# Patient Record
Sex: FEMALE | Race: BLACK OR AFRICAN AMERICAN | NOT HISPANIC OR LATINO | URBAN - METROPOLITAN AREA
[De-identification: names, ages, dates, MRNs, and addresses within clinical notes are randomized per-mention and may not be internally consistent; named-entity substitution may affect disease eponyms.]

---

## 2020-08-07 ENCOUNTER — LAB OUTSIDE AN ENCOUNTER (OUTPATIENT)
Dept: URBAN - METROPOLITAN AREA CLINIC 25 | Facility: CLINIC | Age: 40
End: 2020-08-07

## 2020-08-07 ENCOUNTER — OFFICE VISIT (OUTPATIENT)
Dept: URBAN - METROPOLITAN AREA CLINIC 25 | Facility: CLINIC | Age: 40
End: 2020-08-07
Payer: COMMERCIAL

## 2020-08-07 DIAGNOSIS — E66.01 MORBID (SEVERE) OBESITY DUE TO EXCESS CALORIES: ICD-10-CM

## 2020-08-07 DIAGNOSIS — Z98.84 STATUS POST LAPAROSCOPIC SLEEVE GASTRECTOMY: ICD-10-CM

## 2020-08-07 DIAGNOSIS — K21.9 GASTROESOPHAGEAL REFLUX DISEASE, ESOPHAGITIS PRESENCE NOT SPECIFIED: ICD-10-CM

## 2020-08-07 PROCEDURE — G9903 PT SCRN TBCO ID AS NON USER: HCPCS | Performed by: INTERNAL MEDICINE

## 2020-08-07 PROCEDURE — G8427 DOCREV CUR MEDS BY ELIG CLIN: HCPCS | Performed by: INTERNAL MEDICINE

## 2020-08-07 PROCEDURE — 99213 OFFICE O/P EST LOW 20 MIN: CPT | Performed by: INTERNAL MEDICINE

## 2020-08-07 PROCEDURE — G8417 CALC BMI ABV UP PARAM F/U: HCPCS | Performed by: INTERNAL MEDICINE

## 2020-08-07 RX ORDER — FAMOTIDINE 40 MG/1
1 TABLET AT BEDTIME TABLET, FILM COATED ORAL ONCE A DAY
Qty: 90 | Refills: 1 | OUTPATIENT
Start: 2020-08-07

## 2020-08-07 NOTE — HPI-OTHER HISTORIES
June 2019 - EGD in jan 2017 revealed gastric sleeve anatomy. gastritis. small hiatal hernia. No H Pylori.  Severe heartburn. taking zantac 150 mg which helps some. Went to pcp 2 weeks ago and had labs done. no dysphagia. Hx ventral hernia surgery.  2016  -Pt reports undergoing gastric sleeve surgery as well as lap cholecystectomy in 2013. She has been experiecing abdominal pain - supraumbilical, feels like a lump , tender to palpation, sensation of moving  / churning associated with lump.

## 2020-08-07 NOTE — HPI-TODAY'S VISIT:
AUG 2020 : stopped using ranitidine. no dysphagia. chest burning. seeing dr calixto who is planning surgery for abdominal hernia.  EGD in July 2019 revealed post gastric sleeve anatomy with no other abnormal findings.  Biopsy from the stomach revealed chronic gastritis with no H. pylori, esophageal biopsy did not reveal any esophagitis.

## 2020-08-27 ENCOUNTER — OFFICE VISIT (OUTPATIENT)
Dept: URBAN - METROPOLITAN AREA SURGERY CENTER 20 | Facility: SURGERY CENTER | Age: 40
End: 2020-08-27
Payer: COMMERCIAL

## 2020-08-27 DIAGNOSIS — K29.30 CHRONIC SUPERFICIAL GASTRITIS: ICD-10-CM

## 2020-08-27 PROCEDURE — G8907 PT DOC NO EVENTS ON DISCHARG: HCPCS | Performed by: INTERNAL MEDICINE

## 2020-08-27 PROCEDURE — 43239 EGD BIOPSY SINGLE/MULTIPLE: CPT | Performed by: INTERNAL MEDICINE

## 2020-08-27 RX ORDER — ESOMEPRAZOLE MAGNESIUM 20 MG/1
1 CAPSULE, 1 HR PRIOR TO BREAKFAST CAPSULE, DELAYED RELEASE ORAL ONCE A DAY
Qty: 90 | Refills: 3 | OUTPATIENT
Start: 2020-08-27

## 2020-08-27 RX ORDER — FAMOTIDINE 40 MG/1
1 TABLET AT BEDTIME TABLET, FILM COATED ORAL ONCE A DAY
Qty: 90 | Refills: 1 | Status: ACTIVE | COMMUNITY
Start: 2020-08-07

## 2020-09-01 ENCOUNTER — TELEPHONE ENCOUNTER (OUTPATIENT)
Dept: URBAN - METROPOLITAN AREA CLINIC 92 | Facility: CLINIC | Age: 40
End: 2020-09-01

## 2020-09-11 ENCOUNTER — TELEPHONE ENCOUNTER (OUTPATIENT)
Dept: URBAN - METROPOLITAN AREA CLINIC 25 | Facility: CLINIC | Age: 40
End: 2020-09-11

## 2020-10-06 ENCOUNTER — TELEPHONE ENCOUNTER (OUTPATIENT)
Dept: URBAN - METROPOLITAN AREA CLINIC 25 | Facility: CLINIC | Age: 40
End: 2020-10-06

## 2020-10-07 ENCOUNTER — TELEPHONE ENCOUNTER (OUTPATIENT)
Dept: URBAN - METROPOLITAN AREA CLINIC 25 | Facility: CLINIC | Age: 40
End: 2020-10-07

## 2020-10-07 RX ORDER — PANTOPRAZOLE SODIUM 20 MG/1
1 TABLET TABLET, DELAYED RELEASE ORAL ONCE A DAY
Qty: 90 TABLET | OUTPATIENT
Start: 2020-10-07

## 2020-10-09 ENCOUNTER — OFFICE VISIT (OUTPATIENT)
Dept: URBAN - METROPOLITAN AREA CLINIC 25 | Facility: CLINIC | Age: 40
End: 2020-10-09

## 2020-10-16 ENCOUNTER — LAB OUTSIDE AN ENCOUNTER (OUTPATIENT)
Dept: URBAN - METROPOLITAN AREA CLINIC 37 | Facility: CLINIC | Age: 40
End: 2020-10-16

## 2020-10-16 ENCOUNTER — OFFICE VISIT (OUTPATIENT)
Dept: URBAN - METROPOLITAN AREA CLINIC 37 | Facility: CLINIC | Age: 40
End: 2020-10-16

## 2020-10-16 VITALS — HEIGHT: 65 IN | WEIGHT: 236 LBS | BODY MASS INDEX: 39.32 KG/M2

## 2020-10-16 RX ORDER — HYOSCYAMINE SULFATE 0.125 MG
1 TABLET ON THE TONGUE AND ALLOW TO DISSOLVE  AS NEEDED TABLET,DISINTEGRATING ORAL
Status: ACTIVE | COMMUNITY

## 2020-10-16 RX ORDER — METOCLOPRAMIDE HYDROCHLORIDE 10 MG/1
1 TABLET TABLET ORAL
Qty: 90 | Status: ACTIVE | COMMUNITY

## 2020-10-16 RX ORDER — NALTREXONE HYDROCHLORIDE AND BUPROPION HYDROCHLORIDE 8; 90 MG/1; MG/1
2 TABLETS TABLET, EXTENDED RELEASE ORAL TWICE A DAY
Qty: 120 | Status: ACTIVE | COMMUNITY

## 2020-10-16 RX ORDER — SUCRALFATE 1 G/10ML
10 ML ON AN EMPTY STOMACH BEFORE MEALS SUSPENSION ORAL TID
Status: ACTIVE | COMMUNITY

## 2020-10-16 RX ORDER — AMITRIPTYLINE HYDROCHLORIDE 10 MG/1
1 TABLET AT BEDTIME TABLET, FILM COATED ORAL ONCE A DAY
Qty: 30 | Status: ACTIVE | COMMUNITY

## 2020-10-16 RX ORDER — SCOPOLAMINE 1 MG/3D
1 PATCH TO SKIN BEHIND THE EAR AS NEEDED PATCH TRANSDERMAL
Qty: 9 | Status: ON HOLD | COMMUNITY

## 2020-10-16 RX ORDER — KETOROLAC TROMETHAMINE 30 MG/ML
0.5 ML AS NEEDED INJECTION, SOLUTION INTRAMUSCULAR; INTRAVENOUS
Qty: 10 | Status: ACTIVE | COMMUNITY

## 2020-10-16 NOTE — HPI-MIGRATED HPI
Initial consultation : Patient is here for -> Nausea, spitting up food Patient has history of gastric sleeve in 2014, recently has experienced severe heartburn and spit up food . She went to see Dr. Rock Hightower at [facility]   and had the sleeve revised due to " being twisted" reportedly. After the surgery, she even felt worse. Subsequently, she had a EGD with balloon dilatation up to 18 mm  on 10/12/2020 due to dysphagia  Her symptoms progressively worsen and she was referred to GI specialist for further evaluation. According to the patient she was told that she needs EGD with  bigger balloon dilatation  Russell had a Laparoscopic paraesophageal hernia repair on 09/08/2020 Patient was prescribed Reglan 10 mg QID   * CTscan abdomen/pelvis w/ contrast on 09/14/2020 1. Moderate infiltration of the subcutaneous fat in the periumbilical region with associated gas, presumably reactive from the known history of recent surgery. No focal fluid collections are seen here. Recommend continued close clinical monitoring and imaging follow-up to resolution to exclude developing infection here 2. No gross bowel obstruction or bowel wall thickening. No obstructive nephropathy 3. Mild dependent bibasilar ateleotasis, left greater than right 4. Trace free fluid in the pelvis, possibly postsurgical or physiologic  Current symptoms: dysphagia, spitting up food . Pain in abdomen;

## 2020-10-17 PROBLEM — 161533003: Status: ACTIVE | Noted: 2020-10-17

## 2020-10-17 PROBLEM — 161615003: Status: ACTIVE | Noted: 2020-10-17

## 2020-10-17 PROBLEM — 162864005: Status: ACTIVE | Noted: 2020-10-17

## 2020-10-17 PROBLEM — 40739000: Status: ACTIVE | Noted: 2020-10-16

## 2020-10-17 PROBLEM — 63305008: Status: ACTIVE | Noted: 2020-10-17

## 2020-10-17 PROBLEM — 235595009: Status: ACTIVE | Noted: 2020-10-17

## 2020-10-23 ENCOUNTER — OFFICE VISIT (OUTPATIENT)
Dept: URBAN - METROPOLITAN AREA MEDICAL CENTER 10 | Facility: MEDICAL CENTER | Age: 40
End: 2020-10-23

## 2020-10-23 ENCOUNTER — TELEPHONE ENCOUNTER (OUTPATIENT)
Dept: URBAN - METROPOLITAN AREA CLINIC 92 | Facility: CLINIC | Age: 40
End: 2020-10-23

## 2020-10-23 ENCOUNTER — OFFICE VISIT (OUTPATIENT)
Dept: URBAN - METROPOLITAN AREA TELEHEALTH 2 | Facility: TELEHEALTH | Age: 40
End: 2020-10-23

## 2020-10-23 RX ORDER — HYOSCYAMINE SULFATE 0.125 MG
1 TABLET ON THE TONGUE AND ALLOW TO DISSOLVE  AS NEEDED TABLET,DISINTEGRATING ORAL
Status: ACTIVE | COMMUNITY

## 2020-10-23 RX ORDER — KETOROLAC TROMETHAMINE 30 MG/ML
0.5 ML AS NEEDED INJECTION, SOLUTION INTRAMUSCULAR; INTRAVENOUS
Qty: 10 | Status: ACTIVE | COMMUNITY

## 2020-10-23 RX ORDER — FAMOTIDINE 40 MG/1
1 TABLET AT BEDTIME TABLET, FILM COATED ORAL ONCE A DAY
Qty: 90 | Refills: 1 | Status: ACTIVE | COMMUNITY
Start: 2020-08-07

## 2020-10-23 RX ORDER — SUCRALFATE 1 G/10ML
10 ML ON AN EMPTY STOMACH BEFORE MEALS SUSPENSION ORAL TID
Status: ACTIVE | COMMUNITY

## 2020-10-23 RX ORDER — ESOMEPRAZOLE MAGNESIUM 20 MG/1
1 CAPSULE, 1 HR PRIOR TO BREAKFAST CAPSULE, DELAYED RELEASE ORAL ONCE A DAY
Qty: 90 | Refills: 3 | Status: ACTIVE | COMMUNITY
Start: 2020-08-27

## 2020-10-23 RX ORDER — PANTOPRAZOLE SODIUM 20 MG/1
1 TABLET TABLET, DELAYED RELEASE ORAL ONCE A DAY
Qty: 90 TABLET | Status: ACTIVE | COMMUNITY
Start: 2020-10-07

## 2020-10-23 RX ORDER — FAMOTIDINE 40 MG/1
1 TABLET AT BEDTIME TABLET, FILM COATED ORAL ONCE A DAY
Qty: 90 | Refills: 1 | OUTPATIENT

## 2020-10-23 RX ORDER — NALTREXONE HYDROCHLORIDE AND BUPROPION HYDROCHLORIDE 8; 90 MG/1; MG/1
2 TABLETS TABLET, EXTENDED RELEASE ORAL TWICE A DAY
Qty: 120 | Status: ACTIVE | COMMUNITY

## 2020-10-23 RX ORDER — AMITRIPTYLINE HYDROCHLORIDE 10 MG/1
1 TABLET AT BEDTIME TABLET, FILM COATED ORAL ONCE A DAY
Qty: 30 | Status: ACTIVE | COMMUNITY

## 2020-10-23 RX ORDER — METOCLOPRAMIDE HYDROCHLORIDE 10 MG/1
1 TABLET TABLET ORAL
Qty: 90 | Status: ACTIVE | COMMUNITY

## 2020-10-23 RX ORDER — SCOPOLAMINE 1 MG/3D
1 PATCH TO SKIN BEHIND THE EAR AS NEEDED PATCH TRANSDERMAL
Qty: 9 | Status: ON HOLD | COMMUNITY

## 2020-10-23 NOTE — HPI-OTHER HISTORIES
AUG 2020 : stopped using ranitidine. no dysphagia. chest burning. seeing dr calixto who is planning surgery for abdominal hernia. EGD in July 2019 revealed post gastric sleeve anatomy with no other abnormal findings.  Biopsy from the stomach revealed chronic gastritis with no H. pylori, esophageal biopsy did not reveal any esophagitis. June 2019 - EGD in jan 2017 revealed gastric sleeve anatomy. gastritis. small hiatal hernia. No H Pylori.  Severe heartburn. taking zantac 150 mg which helps some. Went to pcp 2 weeks ago and had labs done. no dysphagia. Hx ventral hernia surgery.  2016  -Pt reports undergoing gastric sleeve surgery as well as lap cholecystectomy in 2013. She has been experiecing abdominal pain - supraumbilical, feels like a lump , tender to palpation, sensation of moving  / churning associated with lump.

## 2020-10-23 NOTE — HPI-TODAY'S VISIT:
Oct 2020:  EGD in August 2020 revealed post gastric sleeve anatomy but unrevealing endoscopic features.  No H. pylori infection, no reflux esophagitis present in proximal or distal esophageal biopsy.

## 2020-11-10 ENCOUNTER — OFFICE VISIT (OUTPATIENT)
Dept: URBAN - METROPOLITAN AREA CLINIC 35 | Facility: CLINIC | Age: 40
End: 2020-11-10

## 2020-11-10 VITALS — BODY MASS INDEX: 40.82 KG/M2 | WEIGHT: 245 LBS | HEIGHT: 65 IN

## 2020-11-10 RX ORDER — AMITRIPTYLINE HYDROCHLORIDE 10 MG/1
1 TABLET AT BEDTIME TABLET, FILM COATED ORAL ONCE A DAY
Qty: 30 | Status: ACTIVE | COMMUNITY

## 2020-11-10 RX ORDER — PANTOPRAZOLE SODIUM 40 MG/1
1 TABLET TABLET, DELAYED RELEASE ORAL
Qty: 30 | Refills: 1 | OUTPATIENT
Start: 2020-11-10

## 2020-11-10 RX ORDER — PANTOPRAZOLE SODIUM 40 MG/1
1 TABLET TABLET, DELAYED RELEASE ORAL ONCE A DAY
Status: ON HOLD | COMMUNITY

## 2020-11-10 RX ORDER — HYOSCYAMINE SULFATE 0.125 MG
1 TABLET ON THE TONGUE AND ALLOW TO DISSOLVE  AS NEEDED TABLET,DISINTEGRATING ORAL
Status: ACTIVE | COMMUNITY

## 2020-11-10 RX ORDER — SCOPOLAMINE 1 MG/3D
1 PATCH TO SKIN BEHIND THE EAR AS NEEDED PATCH TRANSDERMAL
Qty: 9 | Status: ON HOLD | COMMUNITY

## 2020-11-10 RX ORDER — FAMOTIDINE 20 MG/1
1 TABLET AT BEDTIME AS NEEDED TABLET, FILM COATED ORAL ONCE A DAY
Qty: 30 | Status: ACTIVE | COMMUNITY

## 2020-11-10 RX ORDER — SUCRALFATE 1 G/10ML
10 ML ON AN EMPTY STOMACH SUSPENSION ORAL TWICE A DAY
Qty: 600 | Refills: 1 | OUTPATIENT
Start: 2020-11-10

## 2020-11-10 RX ORDER — SUCRALFATE 1 G/10ML
10 ML ON AN EMPTY STOMACH BEFORE MEALS SUSPENSION ORAL TID
Status: ACTIVE | COMMUNITY

## 2020-11-10 RX ORDER — KETOROLAC TROMETHAMINE 30 MG/ML
0.5 ML AS NEEDED INJECTION, SOLUTION INTRAMUSCULAR; INTRAVENOUS
Qty: 10 | Status: ACTIVE | COMMUNITY

## 2020-11-10 RX ORDER — METOCLOPRAMIDE HYDROCHLORIDE 10 MG/1
1 TABLET TABLET ORAL
Qty: 90 | Status: ACTIVE | COMMUNITY

## 2020-11-10 RX ORDER — NALTREXONE HYDROCHLORIDE AND BUPROPION HYDROCHLORIDE 8; 90 MG/1; MG/1
2 TABLETS TABLET, EXTENDED RELEASE ORAL TWICE A DAY
Qty: 120 | Status: ACTIVE | COMMUNITY

## 2020-11-10 NOTE — HPI-MIGRATED HPI
Post-op OV : Patient -> denies any new changes in his/her health status since last OV;   Post-op OV : After EGD on -> 10/23/2020 due to post-bariatric anastomotic stenosis, and nausea/vomiting. A small hiatal hernia was present. The GE junction was irregular. There was evidence of a sleeve gastrectomy in the gastric body. Moderate gastric stenosis was found in the incisura. S/p dilatation with a 30 mm pneumonic balloon. The balloon was inflated to 18 PSI (28 mm). Pressure was held for 120 seconds before ballon was deflated and withdrawn. There was a small amount of blood on the balloon. The dilatation site was examined following endoscopic reinsertion and showed moderate improvement in luminal narrowing. Normal duodenum.  After the procedure patient was prescribed Pantoprazole 40 mg daily + Sucralfate 1 gm BID but hasn't been taking any of the medications in these last several days;   Post-op OV : Patient reports of current symptoms -> acid reflux symptoms + pain around umbilicus since the sleeve surgery, ? hernia;   Post-op OV : Patient denies -> rectal bleeding, fever, nausea & vomiting since the procedure date;

## 2020-11-10 NOTE — EXAM-MIGRATED EXAMINATIONS
GENERAL APPEARANCE: - alert, alert, well hydrated, in no distress , well developed, well nourished;   HEAD: - normocephalic, atraumatic;   EYES: - sclera anicteric bilaterally;   NECK/THYROID: - neck supple, full range of motion, no cervical lymphadenopathy, no thyroid nodules, no thyromegaly, trachea midline;   NEUROLOGIC: - cranial nerves 2-12 grossly intact;   PSYCH: - cooperative with exam, mood/affect full range;

## 2020-11-24 PROBLEM — 39839004: Status: ACTIVE | Noted: 2020-11-05

## 2020-11-24 PROBLEM — 608848006: Status: ACTIVE | Noted: 2020-11-05

## 2020-11-24 PROBLEM — 16331000 HEARTBURN: Status: ACTIVE | Noted: 2020-11-10

## 2020-11-24 PROBLEM — 55860008: Status: ACTIVE | Noted: 2020-11-09

## 2020-11-24 PROBLEM — 16932000: Status: ACTIVE | Noted: 2020-11-05

## 2020-11-25 ENCOUNTER — OFFICE VISIT (OUTPATIENT)
Dept: URBAN - METROPOLITAN AREA CLINIC 35 | Facility: CLINIC | Age: 40
End: 2020-11-25

## 2020-11-25 RX ORDER — PANTOPRAZOLE SODIUM 40 MG/1
1 TABLET TABLET, DELAYED RELEASE ORAL
Qty: 30 | Refills: 1

## 2020-11-25 RX ORDER — PANTOPRAZOLE SODIUM 40 MG/1
1 TABLET TABLET, DELAYED RELEASE ORAL
Qty: 30 | Refills: 1 | COMMUNITY
Start: 2020-11-10

## 2020-11-25 RX ORDER — SUCRALFATE 1 G/10ML
10 ML ON AN EMPTY STOMACH SUSPENSION ORAL TWICE A DAY
Qty: 600 | Refills: 1 | COMMUNITY
Start: 2020-11-10

## 2020-11-25 RX ORDER — METOCLOPRAMIDE HYDROCHLORIDE 10 MG/1
1 TABLET TABLET ORAL
Qty: 90 | COMMUNITY

## 2020-11-25 RX ORDER — AMITRIPTYLINE HYDROCHLORIDE 10 MG/1
1 TABLET AT BEDTIME TABLET, FILM COATED ORAL ONCE A DAY
Qty: 30 | COMMUNITY

## 2020-11-25 RX ORDER — SCOPOLAMINE 1 MG/3D
1 PATCH TO SKIN BEHIND THE EAR AS NEEDED PATCH TRANSDERMAL
Qty: 9 | COMMUNITY

## 2020-11-25 RX ORDER — KETOROLAC TROMETHAMINE 30 MG/ML
0.5 ML AS NEEDED INJECTION, SOLUTION INTRAMUSCULAR; INTRAVENOUS
Qty: 10 | COMMUNITY

## 2020-11-25 RX ORDER — NALTREXONE HYDROCHLORIDE AND BUPROPION HYDROCHLORIDE 8; 90 MG/1; MG/1
2 TABLETS TABLET, EXTENDED RELEASE ORAL TWICE A DAY
Qty: 120 | COMMUNITY

## 2020-11-25 RX ORDER — FAMOTIDINE 20 MG/1
1 TABLET AT BEDTIME AS NEEDED TABLET, FILM COATED ORAL ONCE A DAY
Qty: 30 | COMMUNITY

## 2020-11-25 RX ORDER — HYOSCYAMINE SULFATE 0.125 MG
1 TABLET ON THE TONGUE AND ALLOW TO DISSOLVE  AS NEEDED TABLET,DISINTEGRATING ORAL
COMMUNITY

## 2020-11-25 RX ORDER — SUCRALFATE 1 G/10ML
10 ML ON AN EMPTY STOMACH SUSPENSION ORAL TWICE A DAY
Qty: 600 ML | Refills: 1

## 2020-11-25 NOTE — HPI-MIGRATED HPI
Follow up OV : Patient is here for a routine OV for -> Gastric stenosis, Nausea/Vomiting and Heartburn ;   Follow up OV : Last OV was -> 2 weeks ago Gastric stenosis/Nausea/Vomiting:  Patient has a surgical history of gastric sleeve in 2014 EGD with me on 10/2020 due to post-bariatric anastomotic stenosis and nausea/vomiting. A small hiatal hernia was present. Moderate stenosis was found at the incisure S/p dilatation with a 30 mm pneumonic balloon. The balloon was inflated to 18 PSI (28 mm). The dilatation site was examined following endoscopic reinsertion and showed moderate improvement in luminal narrowing Patient continues using Zofran + antiemetic patch PRN  Current symptoms: ??   Heartburn:  Patient continues taking Sucralfate 10 mL BID + Pantoprazole 40 mg daily as advised last OV Current symptoms: ?? ;   Follow up OV : Patient is on -> ;

## 2020-11-25 NOTE — EXAM-MIGRATED EXAMINATIONS
GENERAL APPEARANCE: - alert, in no acute distress, well developed, well nourished;   HEAD: - normocephalic, atraumatic;   EYES: - sclera anicteric bilaterally;   NECK/THYROID: - neck supple, full range of motion, no cervical lymphadenopathy, no lymphadenopathy, no thyroid nodules, no thyromegaly, trachea midline;   NEUROLOGIC: - cranial nerves 2-12 grossly intact;   PSYCH: - cooperative with exam, mood/affect full range;

## 2021-01-07 ENCOUNTER — TELEPHONE ENCOUNTER (OUTPATIENT)
Dept: URBAN - METROPOLITAN AREA CLINIC 35 | Facility: CLINIC | Age: 41
End: 2021-01-07

## 2021-01-08 ENCOUNTER — ERX REFILL RESPONSE (OUTPATIENT)
Dept: URBAN - METROPOLITAN AREA CLINIC 25 | Facility: CLINIC | Age: 41
End: 2021-01-08

## 2021-01-08 RX ORDER — PANTOPRAZOLE SODIUM 20 MG/1
1 TABLET TABLET, DELAYED RELEASE ORAL ONCE A DAY
Qty: 90 | Refills: 4

## 2021-01-12 ENCOUNTER — OFFICE VISIT (OUTPATIENT)
Dept: URBAN - METROPOLITAN AREA CLINIC 35 | Facility: CLINIC | Age: 41
End: 2021-01-12

## 2021-01-12 VITALS — HEIGHT: 65 IN | BODY MASS INDEX: 40.82 KG/M2 | WEIGHT: 245 LBS

## 2021-01-12 RX ORDER — FAMOTIDINE 20 MG/1
1 TABLET AT BEDTIME AS NEEDED TABLET, FILM COATED ORAL ONCE A DAY
Qty: 30 | Status: ON HOLD | COMMUNITY

## 2021-01-12 RX ORDER — SCOPOLAMINE 1 MG/3D
1 PATCH TO SKIN BEHIND THE EAR AS NEEDED PATCH TRANSDERMAL
Qty: 9 | Status: ON HOLD | COMMUNITY

## 2021-01-12 RX ORDER — METOCLOPRAMIDE HYDROCHLORIDE 10 MG/1
1 TABLET TABLET ORAL
Qty: 90 | Status: ON HOLD | COMMUNITY

## 2021-01-12 RX ORDER — NALTREXONE HYDROCHLORIDE AND BUPROPION HYDROCHLORIDE 8; 90 MG/1; MG/1
2 TABLETS TABLET, EXTENDED RELEASE ORAL TWICE A DAY
Qty: 120 | Status: ON HOLD | COMMUNITY

## 2021-01-12 RX ORDER — AMITRIPTYLINE HYDROCHLORIDE 10 MG/1
1 TABLET AT BEDTIME TABLET, FILM COATED ORAL ONCE A DAY
Qty: 30 | Status: ON HOLD | COMMUNITY

## 2021-01-12 RX ORDER — PANTOPRAZOLE SODIUM 40 MG/1
1 TABLET TABLET, DELAYED RELEASE ORAL
Qty: 30 | Refills: 1

## 2021-01-12 RX ORDER — KETOROLAC TROMETHAMINE 30 MG/ML
0.5 ML AS NEEDED INJECTION, SOLUTION INTRAMUSCULAR; INTRAVENOUS
Qty: 10 | Status: ON HOLD | COMMUNITY

## 2021-01-12 RX ORDER — PANTOPRAZOLE SODIUM 40 MG/1
1 TABLET TABLET, DELAYED RELEASE ORAL
Qty: 30 | Refills: 1 | Status: ACTIVE | COMMUNITY

## 2021-01-12 RX ORDER — SUCRALFATE 1 G/10ML
10 ML ON AN EMPTY STOMACH SUSPENSION ORAL TWICE A DAY
Qty: 280 ML | Refills: 1

## 2021-01-12 RX ORDER — SUCRALFATE 1 G/10ML
10 ML ON AN EMPTY STOMACH SUSPENSION ORAL TWICE A DAY
Qty: 600 ML | Refills: 1 | Status: ACTIVE | COMMUNITY

## 2021-01-12 RX ORDER — HYOSCYAMINE SULFATE 0.125 MG
1 TABLET ON THE TONGUE AND ALLOW TO DISSOLVE  AS NEEDED TABLET,DISINTEGRATING ORAL
Status: ON HOLD | COMMUNITY

## 2021-02-12 ENCOUNTER — TELEPHONE ENCOUNTER (OUTPATIENT)
Dept: URBAN - METROPOLITAN AREA CLINIC 35 | Facility: CLINIC | Age: 41
End: 2021-02-12

## 2021-07-30 NOTE — HPI-MIGRATED HPI
Follow up OV : Last OV was -> 2 months ago  Gastric stenosis/Nausea/Vomiting:  Patient has a surgical history of gastric sleeve in 2014 EGD with me on 10/2020 due to post-bariatric anastomotic stenosis and nausea/vomiting. A small hiatal hernia was present. Moderate stenosis was found at the incisure S/p dilatation with a 30 mm pneumonic balloon. The balloon was inflated to 18 PSI (28 mm). The dilatation site was examined following endoscopic reinsertion and showed moderate improvement in luminal narrowing Patient no longer using Zofran or antiemetic patch Current symptoms: occasional nausea only Able to eat better now  Heartburn:  Patient continues taking Sucralfate 10 mL BID + Pantoprazole 40 mg  Current symptoms: + heartburn symptoms if not on those meds;   Follow up OV : Patient is on -> ;   Follow up OV : Patient is here for a routine OV for -> Gastric stenosis, Nausea/Vomiting and Heartburn ;    160

## 2021-08-11 ENCOUNTER — TELEPHONE ENCOUNTER (OUTPATIENT)
Dept: URBAN - METROPOLITAN AREA CLINIC 25 | Facility: CLINIC | Age: 41
End: 2021-08-11

## 2021-08-20 ENCOUNTER — LAB OUTSIDE AN ENCOUNTER (OUTPATIENT)
Dept: URBAN - METROPOLITAN AREA CLINIC 25 | Facility: CLINIC | Age: 41
End: 2021-08-20

## 2021-08-20 ENCOUNTER — OFFICE VISIT (OUTPATIENT)
Dept: URBAN - METROPOLITAN AREA CLINIC 25 | Facility: CLINIC | Age: 41
End: 2021-08-20
Payer: COMMERCIAL

## 2021-08-20 ENCOUNTER — WEB ENCOUNTER (OUTPATIENT)
Dept: URBAN - METROPOLITAN AREA CLINIC 25 | Facility: CLINIC | Age: 41
End: 2021-08-20

## 2021-08-20 VITALS
DIASTOLIC BLOOD PRESSURE: 62 MMHG | BODY MASS INDEX: 40.98 KG/M2 | WEIGHT: 255 LBS | TEMPERATURE: 97.1 F | SYSTOLIC BLOOD PRESSURE: 132 MMHG | HEIGHT: 66 IN

## 2021-08-20 DIAGNOSIS — R10.9 CENTRAL ABDOMINAL PAIN: ICD-10-CM

## 2021-08-20 DIAGNOSIS — K21.9 GASTROESOPHAGEAL REFLUX DISEASE, ESOPHAGITIS PRESENCE NOT SPECIFIED: ICD-10-CM

## 2021-08-20 DIAGNOSIS — K42.9 PERIUMBILICAL HERNIA: ICD-10-CM

## 2021-08-20 DIAGNOSIS — Z98.84 STATUS POST LAPAROSCOPIC SLEEVE GASTRECTOMY: ICD-10-CM

## 2021-08-20 DIAGNOSIS — E66.01 MORBID (SEVERE) OBESITY DUE TO EXCESS CALORIES: ICD-10-CM

## 2021-08-20 PROCEDURE — 99214 OFFICE O/P EST MOD 30 MIN: CPT | Performed by: INTERNAL MEDICINE

## 2021-08-20 RX ORDER — PANTOPRAZOLE SODIUM 20 MG/1
1 TABLET TABLET, DELAYED RELEASE ORAL ONCE A DAY
Qty: 90 | Refills: 4 | Status: ON HOLD | COMMUNITY

## 2021-08-20 RX ORDER — FAMOTIDINE 40 MG/1
1 TABLET AT BEDTIME TABLET, FILM COATED ORAL ONCE A DAY
Qty: 90 | Refills: 1 | Status: ACTIVE | COMMUNITY

## 2021-08-20 RX ORDER — ESOMEPRAZOLE MAGNESIUM 20 MG/1
1 CAPSULE, 1 HR PRIOR TO BREAKFAST CAPSULE, DELAYED RELEASE ORAL ONCE A DAY
Qty: 90 | Refills: 3 | Status: ON HOLD | COMMUNITY
Start: 2020-08-27

## 2021-08-20 NOTE — PHYSICAL EXAM GASTROINTESTINAL
Abdomen , soft, supraumbilical firm tender nodule , nondistended , no guarding or rigidity , no masses palpable , normal bowel sounds , Liver and Spleen , no hepatomegaly present , liver nontender , spleen not palpable

## 2021-08-20 NOTE — HPI-TODAY'S VISIT:
AUG 2021 :     EGD in August 2020 revealed post gastric sleeve anatomy but unrevealing endoscopic features.  No H. pylori infection, no reflux esophagitis present in proximal or distal esophageal biopsy.  underwent gastric surgery in sept 2020.  then underwent another revision surgery. was referred to  dr mcfadden. thinks she had another egd with possible dilation. Continues to have heartburn.    Went to [facility] and underwent CT scan - we dont have results. Was told she had an umbilical hernia ?  Records retrieved from [facility] from July 18, 2021.  Labs revealed a hemoglobin of 10.5, normal kidney function, normal WBC, normal AST, ALT, bilirubin, alkaline phosphatase, lipase,  a pelvic ultrasound revealed bilateral small simple ovarian cysts.  The CT scan of the abdomen and pelvis with contrast revealed small but irregular shaped fat-containing periumbilical hernia with mild stranding as well as a 4 mm right kidney stone and a left 3.3 cm simple appearing ovarian cyst.

## 2022-06-27 ENCOUNTER — LAB OUTSIDE AN ENCOUNTER (OUTPATIENT)
Dept: URBAN - METROPOLITAN AREA CLINIC 25 | Facility: CLINIC | Age: 42
End: 2022-06-27

## 2022-06-27 ENCOUNTER — WEB ENCOUNTER (OUTPATIENT)
Dept: URBAN - METROPOLITAN AREA CLINIC 25 | Facility: CLINIC | Age: 42
End: 2022-06-27

## 2022-06-27 ENCOUNTER — OFFICE VISIT (OUTPATIENT)
Dept: URBAN - METROPOLITAN AREA CLINIC 25 | Facility: CLINIC | Age: 42
End: 2022-06-27
Payer: COMMERCIAL

## 2022-06-27 VITALS
HEIGHT: 66 IN | HEART RATE: 92 BPM | BODY MASS INDEX: 36 KG/M2 | TEMPERATURE: 97.2 F | WEIGHT: 224 LBS | DIASTOLIC BLOOD PRESSURE: 83 MMHG | SYSTOLIC BLOOD PRESSURE: 124 MMHG

## 2022-06-27 DIAGNOSIS — K21.9 GASTROESOPHAGEAL REFLUX DISEASE, ESOPHAGITIS PRESENCE NOT SPECIFIED: ICD-10-CM

## 2022-06-27 DIAGNOSIS — Z98.84 STATUS POST LAPAROSCOPIC SLEEVE GASTRECTOMY: ICD-10-CM

## 2022-06-27 DIAGNOSIS — E66.01 MORBID (SEVERE) OBESITY DUE TO EXCESS CALORIES: ICD-10-CM

## 2022-06-27 DIAGNOSIS — K42.9 PERIUMBILICAL HERNIA: ICD-10-CM

## 2022-06-27 DIAGNOSIS — K92.1 HEMATOCHEZIA: ICD-10-CM

## 2022-06-27 PROCEDURE — 99214 OFFICE O/P EST MOD 30 MIN: CPT | Performed by: INTERNAL MEDICINE

## 2022-06-27 RX ORDER — ESOMEPRAZOLE MAGNESIUM 20 MG/1
1 CAPSULE, 1 HR PRIOR TO BREAKFAST CAPSULE, DELAYED RELEASE ORAL ONCE A DAY
Qty: 90 | Refills: 3 | Status: ON HOLD | COMMUNITY
Start: 2020-08-27

## 2022-06-27 RX ORDER — FAMOTIDINE 40 MG/1
1 TABLET AT BEDTIME TABLET, FILM COATED ORAL ONCE A DAY
Qty: 90 | Refills: 1 | Status: ON HOLD | COMMUNITY

## 2022-06-27 RX ORDER — PANTOPRAZOLE SODIUM 20 MG/1
1 TABLET TABLET, DELAYED RELEASE ORAL ONCE A DAY
Qty: 90 | Refills: 4 | Status: ACTIVE | COMMUNITY

## 2022-06-27 NOTE — HPI-TODAY'S VISIT:
June 2022 visit: Upper GI was ordered but I do not see this was done. undergoing pelvic surgery. hx rectal bleeding X 1 month. intermittent symptoms. painless.1 garndparent had colon cancer. no prior colonoscopy. saw dr carlito lester and dr mcmillan. thinks she had a gastric bypass.

## 2022-06-27 NOTE — HPI-OTHER HISTORIES
AUG 2021 :     EGD in August 2020 revealed post gastric sleeve anatomy but unrevealing endoscopic features.  No H. pylori infection, no reflux esophagitis present in proximal or distal esophageal biopsy.  underwent gastric surgery in sept 2020.  then underwent another revision surgery. was referred to  dr mcfadden. thinks she had another egd with possible dilation. Continues to have heartburn.    Went to [facility] and underwent CT scan - we dont have results. Was told she had an umbilical hernia ?  Records retrieved from [facility] from July 18, 2021.  Labs revealed a hemoglobin of 10.5, normal kidney function, normal WBC, normal AST, ALT, bilirubin, alkaline phosphatase, lipase,  a pelvic ultrasound revealed bilateral small simple ovarian cysts.  The CT scan of the abdomen and pelvis with contrast revealed small but irregular shaped fat-containing periumbilical hernia with mild stranding as well as a 4 mm right kidney stone and a left 3.3 cm simple appearing ovarian cyst.  AUG 2020 : stopped using ranitidine. no dysphagia. chest burning. seeing dr calixto who is planning surgery for abdominal hernia. EGD in July 2019 revealed post gastric sleeve anatomy with no other abnormal findings.  Biopsy from the stomach revealed chronic gastritis with no H. pylori, esophageal biopsy did not reveal any esophagitis. June 2019 - EGD in jan 2017 revealed gastric sleeve anatomy. gastritis. small hiatal hernia. No H Pylori.  Severe heartburn. taking zantac 150 mg which helps some. Went to pcp 2 weeks ago and had labs done. no dysphagia. Hx ventral hernia surgery.  2016  -Pt reports undergoing gastric sleeve surgery as well as lap cholecystectomy in 2013. She has been experiecing abdominal pain - supraumbilical, feels like a lump , tender to palpation, sensation of moving  / churning associated with lump.

## 2022-07-21 ENCOUNTER — CLAIMS CREATED FROM THE CLAIM WINDOW (OUTPATIENT)
Dept: URBAN - METROPOLITAN AREA CLINIC 4 | Facility: CLINIC | Age: 42
End: 2022-07-21
Payer: COMMERCIAL

## 2022-07-21 ENCOUNTER — OFFICE VISIT (OUTPATIENT)
Dept: URBAN - METROPOLITAN AREA SURGERY CENTER 20 | Facility: SURGERY CENTER | Age: 42
End: 2022-07-21
Payer: COMMERCIAL

## 2022-07-21 DIAGNOSIS — K92.1 ACUTE MELENA: ICD-10-CM

## 2022-07-21 DIAGNOSIS — K63.89 OTHER SPECIFIED DISEASES OF INTESTINE: ICD-10-CM

## 2022-07-21 DIAGNOSIS — K31.89 OTHER DISEASES OF STOMACH AND DUODENUM: ICD-10-CM

## 2022-07-21 DIAGNOSIS — K63.5 BENIGN COLON POLYP: ICD-10-CM

## 2022-07-21 DIAGNOSIS — K21.9 GASTRO-ESOPHAGEAL REFLUX DISEASE WITHOUT ESOPHAGITIS: ICD-10-CM

## 2022-07-21 DIAGNOSIS — K21.9 ACID REFLUX: ICD-10-CM

## 2022-07-21 PROCEDURE — 43239 EGD BIOPSY SINGLE/MULTIPLE: CPT | Performed by: INTERNAL MEDICINE

## 2022-07-21 PROCEDURE — 88312 SPECIAL STAINS GROUP 1: CPT | Performed by: PATHOLOGY

## 2022-07-21 PROCEDURE — 88305 TISSUE EXAM BY PATHOLOGIST: CPT | Performed by: PATHOLOGY

## 2022-07-21 PROCEDURE — 45380 COLONOSCOPY AND BIOPSY: CPT | Performed by: INTERNAL MEDICINE

## 2022-07-21 PROCEDURE — G8907 PT DOC NO EVENTS ON DISCHARG: HCPCS | Performed by: INTERNAL MEDICINE

## 2022-07-28 ENCOUNTER — TELEPHONE ENCOUNTER (OUTPATIENT)
Dept: URBAN - METROPOLITAN AREA CLINIC 92 | Facility: CLINIC | Age: 42
End: 2022-07-28

## 2022-08-02 ENCOUNTER — TELEPHONE ENCOUNTER (OUTPATIENT)
Dept: URBAN - METROPOLITAN AREA CLINIC 25 | Facility: CLINIC | Age: 42
End: 2022-08-02

## 2022-08-02 RX ORDER — DEXLANSOPRAZOLE 60 MG/1
1 CAPSULE CAPSULE, DELAYED RELEASE ORAL ONCE A DAY
Qty: 90 | Refills: 0 | OUTPATIENT
Start: 2022-08-02

## 2022-08-03 ENCOUNTER — TELEPHONE ENCOUNTER (OUTPATIENT)
Dept: URBAN - METROPOLITAN AREA CLINIC 92 | Facility: CLINIC | Age: 42
End: 2022-08-03

## 2022-08-26 ENCOUNTER — OFFICE VISIT (OUTPATIENT)
Dept: URBAN - METROPOLITAN AREA CLINIC 25 | Facility: CLINIC | Age: 42
End: 2022-08-26
Payer: COMMERCIAL

## 2022-08-26 VITALS
DIASTOLIC BLOOD PRESSURE: 93 MMHG | HEIGHT: 66 IN | HEART RATE: 85 BPM | TEMPERATURE: 97.9 F | WEIGHT: 218 LBS | SYSTOLIC BLOOD PRESSURE: 153 MMHG | BODY MASS INDEX: 35.03 KG/M2

## 2022-08-26 DIAGNOSIS — Z98.84 STATUS POST LAPAROSCOPIC SLEEVE GASTRECTOMY: ICD-10-CM

## 2022-08-26 DIAGNOSIS — K42.9 PERIUMBILICAL HERNIA: ICD-10-CM

## 2022-08-26 DIAGNOSIS — K92.1 HEMATOCHEZIA: ICD-10-CM

## 2022-08-26 DIAGNOSIS — E66.01 MORBID (SEVERE) OBESITY DUE TO EXCESS CALORIES: ICD-10-CM

## 2022-08-26 DIAGNOSIS — K21.9 GASTROESOPHAGEAL REFLUX DISEASE, ESOPHAGITIS PRESENCE NOT SPECIFIED: ICD-10-CM

## 2022-08-26 DIAGNOSIS — K59.04 CHRONIC IDIOPATHIC CONSTIPATION: ICD-10-CM

## 2022-08-26 PROCEDURE — 99213 OFFICE O/P EST LOW 20 MIN: CPT | Performed by: INTERNAL MEDICINE

## 2022-08-26 RX ORDER — PANTOPRAZOLE SODIUM 20 MG/1
1 TABLET TABLET, DELAYED RELEASE ORAL ONCE A DAY
Qty: 90 | Refills: 4 | Status: ACTIVE | COMMUNITY

## 2022-08-26 RX ORDER — HYDROCORTISONE 25 MG/G
1 APPLICATION CREAM TOPICAL TWICE A DAY
Qty: 1 | Refills: 0 | OUTPATIENT
Start: 2022-08-26 | End: 2022-09-09

## 2022-08-26 RX ORDER — DEXLANSOPRAZOLE 60 MG/1
1 CAPSULE CAPSULE, DELAYED RELEASE ORAL ONCE A DAY
Qty: 90 | Refills: 0 | Status: ON HOLD | COMMUNITY
Start: 2022-08-02

## 2022-08-26 NOTE — HPI-TODAY'S VISIT:
August 22 visit: Patient underwent a repeat EGD and a colonoscopy for further evaluation of her GI symptoms.  EGD was performed in July 22 which revealed post gastric bypass anatomy, normal esophagus, gastric pouch and jejunum with a normal anastomosis.  Gastric pouch biopsy did not reveal any H. pylori and distal esophagus biopsy revealed mild reflux type changes.  A colonoscopy in July 22 revealed 2 hyperplastic sigmoid polyps, normal terminal ileum, no endoscopic colitis, path confirmed benign hyperplastic polyps.  on protonix 20 mg and using carafate. still gets heartburn. despite using otc laxatives gets constipated.  recently had gyn surgery - unclear on exact details .dr fer shrestha.

## 2022-08-26 NOTE — HPI-OTHER HISTORIES
June 2022 visit: Upper GI was ordered but I do not see this was done. undergoing pelvic surgery. hx rectal bleeding X 1 month. intermittent symptoms. painless.1 garndparent had colon cancer. no prior colonoscopy. saw dr carlito lester and dr mcmillan. thinks she had a gastric bypass.   AUG 2021 :  EGD in August 2020 revealed post gastric sleeve anatomy but unrevealing endoscopic features.  No H. pylori infection, no reflux esophagitis present in proximal or distal esophageal biopsy.  underwent gastric surgery in sept 2020.  then underwent another revision surgery. was referred to  dr mcfadden. thinks she had another egd with possible dilation. Continues to have heartburn.    Went to [facility] and underwent CT scan - we dont have results. Was told she had an umbilical hernia ?  Records retrieved from [facility] from July 18, 2021.  Labs revealed a hemoglobin of 10.5, normal kidney function, normal WBC, normal AST, ALT, bilirubin, alkaline phosphatase, lipase,  a pelvic ultrasound revealed bilateral small simple ovarian cysts.  The CT scan of the abdomen and pelvis with contrast revealed small but irregular shaped fat-containing periumbilical hernia with mild stranding as well as a 4 mm right kidney stone and a left 3.3 cm simple appearing ovarian cyst.  AUG 2020 : stopped using ranitidine. no dysphagia. chest burning. seeing dr calixto who is planning surgery for abdominal hernia. EGD in July 2019 revealed post gastric sleeve anatomy with no other abnormal findings.  Biopsy from the stomach revealed chronic gastritis with no H. pylori, esophageal biopsy did not reveal any esophagitis. June 2019 - EGD in jan 2017 revealed gastric sleeve anatomy. gastritis. small hiatal hernia. No H Pylori.  Severe heartburn. taking zantac 150 mg which helps some. Went to pcp 2 weeks ago and had labs done. no dysphagia. Hx ventral hernia surgery.  2016  -Pt reports undergoing gastric sleeve surgery as well as lap cholecystectomy in 2013. She has been experiecing abdominal pain - supraumbilical, feels like a lump , tender to palpation, sensation of moving  / churning associated with lump.

## 2022-12-12 ENCOUNTER — OFFICE VISIT (OUTPATIENT)
Dept: URBAN - METROPOLITAN AREA CLINIC 25 | Facility: CLINIC | Age: 42
End: 2022-12-12

## 2022-12-12 PROBLEM — 238136002: Status: ACTIVE | Noted: 2020-08-07

## 2022-12-12 PROBLEM — 82934008 CHRONIC IDIOPATHIC CONSTIPATION: Status: ACTIVE | Noted: 2022-08-26

## 2022-12-12 RX ORDER — PANTOPRAZOLE SODIUM 20 MG/1
1 TABLET TABLET, DELAYED RELEASE ORAL ONCE A DAY
Qty: 90 | Refills: 4 | Status: ACTIVE | COMMUNITY

## 2022-12-12 RX ORDER — DEXLANSOPRAZOLE 60 MG/1
1 CAPSULE CAPSULE, DELAYED RELEASE ORAL ONCE A DAY
Qty: 90 | Refills: 0 | Status: ON HOLD | COMMUNITY
Start: 2022-08-02

## 2022-12-12 NOTE — HPI-OTHER HISTORIES
August 22 visit: Patient underwent a repeat EGD and a colonoscopy for further evaluation of her GI symptoms.  EGD was performed in July 22 which revealed post gastric bypass anatomy, normal esophagus, gastric pouch and jejunum with a normal anastomosis.  Gastric pouch biopsy did not reveal any H. pylori and distal esophagus biopsy revealed mild reflux type changes.  A colonoscopy in July 22 revealed 2 hyperplastic sigmoid polyps, normal terminal ileum, no endoscopic colitis, path confirmed benign hyperplastic polyps.  on protonix 20 mg and using carafate. still gets heartburn. despite using otc laxatives gets constipated.  recently had gyn surgery - unclear on exact details .dr fer shrestha.  June 2022 visit: Upper GI was ordered but I do not see this was done. undergoing pelvic surgery. hx rectal bleeding X 1 month. intermittent symptoms. painless.1 garndparent had colon cancer. no prior colonoscopy. saw dr carlito lester and dr mcmillan. thinks she had a gastric bypass.   AUG 2021 :  EGD in August 2020 revealed post gastric sleeve anatomy but unrevealing endoscopic features.  No H. pylori infection, no reflux esophagitis present in proximal or distal esophageal biopsy.  underwent gastric surgery in sept 2020.  then underwent another revision surgery. was referred to  dr mcfadden. thinks she had another egd with possible dilation. Continues to have heartburn.    Went to [facility] and underwent CT scan - we dont have results. Was told she had an umbilical hernia ?  Records retrieved from [facility] from July 18, 2021.  Labs revealed a hemoglobin of 10.5, normal kidney function, normal WBC, normal AST, ALT, bilirubin, alkaline phosphatase, lipase,  a pelvic ultrasound revealed bilateral small simple ovarian cysts.  The CT scan of the abdomen and pelvis with contrast revealed small but irregular shaped fat-containing periumbilical hernia with mild stranding as well as a 4 mm right kidney stone and a left 3.3 cm simple appearing ovarian cyst.  AUG 2020 : stopped using ranitidine. no dysphagia. chest burning. seeing dr calixto who is planning surgery for abdominal hernia. EGD in July 2019 revealed post gastric sleeve anatomy with no other abnormal findings.  Biopsy from the stomach revealed chronic gastritis with no H. pylori, esophageal biopsy did not reveal any esophagitis. June 2019 - EGD in jan 2017 revealed gastric sleeve anatomy. gastritis. small hiatal hernia. No H Pylori.  Severe heartburn. taking zantac 150 mg which helps some. Went to pcp 2 weeks ago and had labs done. no dysphagia. Hx ventral hernia surgery.  2016  -Pt reports undergoing gastric sleeve surgery as well as lap cholecystectomy in 2013. She has been experiecing abdominal pain - supraumbilical, feels like a lump , tender to palpation, sensation of moving  / churning associated with lump.

## 2023-02-09 ENCOUNTER — TELEPHONE ENCOUNTER (OUTPATIENT)
Dept: URBAN - METROPOLITAN AREA CLINIC 25 | Facility: CLINIC | Age: 43
End: 2023-02-09

## 2023-02-13 ENCOUNTER — LAB OUTSIDE AN ENCOUNTER (OUTPATIENT)
Dept: URBAN - METROPOLITAN AREA CLINIC 25 | Facility: CLINIC | Age: 43
End: 2023-02-13

## 2023-02-13 PROBLEM — 608848006 HISTORY OF BARIATRIC SURGICAL PROCEDURE: Status: ACTIVE | Noted: 2020-08-07

## 2023-02-14 PROBLEM — 235595009 GASTROESOPHAGEAL REFLUX DISEASE: Status: ACTIVE | Noted: 2020-08-07

## 2023-02-16 ENCOUNTER — OFFICE VISIT (OUTPATIENT)
Dept: URBAN - METROPOLITAN AREA SURGERY CENTER 20 | Facility: SURGERY CENTER | Age: 43
End: 2023-02-16

## 2023-02-21 ENCOUNTER — TELEPHONE ENCOUNTER (OUTPATIENT)
Dept: URBAN - METROPOLITAN AREA CLINIC 6 | Facility: CLINIC | Age: 43
End: 2023-02-21

## 2023-02-21 ENCOUNTER — OFFICE VISIT (OUTPATIENT)
Dept: URBAN - METROPOLITAN AREA SURGERY CENTER 20 | Facility: SURGERY CENTER | Age: 43
End: 2023-02-21

## 2023-02-21 RX ORDER — HYDROCORTISONE ACETATE 25 MG/1
1 SUPPOSITORY SUPPOSITORY RECTAL ONCE A DAY
Qty: 28 | Refills: 0 | OUTPATIENT
Start: 2023-02-21 | End: 2023-03-07

## 2023-02-21 RX ORDER — DEXLANSOPRAZOLE 60 MG/1
1 CAPSULE CAPSULE, DELAYED RELEASE ORAL ONCE A DAY
Qty: 90 | Refills: 0 | Status: ON HOLD | COMMUNITY
Start: 2022-08-02

## 2023-02-21 RX ORDER — PANTOPRAZOLE SODIUM 20 MG/1
1 TABLET TABLET, DELAYED RELEASE ORAL ONCE A DAY
Qty: 90 | Refills: 4 | Status: ACTIVE | COMMUNITY

## 2023-02-23 ENCOUNTER — TELEPHONE ENCOUNTER (OUTPATIENT)
Dept: URBAN - METROPOLITAN AREA CLINIC 25 | Facility: CLINIC | Age: 43
End: 2023-02-23

## 2023-03-02 ENCOUNTER — TELEPHONE ENCOUNTER (OUTPATIENT)
Dept: URBAN - METROPOLITAN AREA CLINIC 25 | Facility: CLINIC | Age: 43
End: 2023-03-02

## 2023-03-02 ENCOUNTER — OFFICE VISIT (OUTPATIENT)
Dept: URBAN - METROPOLITAN AREA SURGERY CENTER 20 | Facility: SURGERY CENTER | Age: 43
End: 2023-03-02
Payer: COMMERCIAL

## 2023-03-02 ENCOUNTER — LAB OUTSIDE AN ENCOUNTER (OUTPATIENT)
Dept: URBAN - METROPOLITAN AREA CLINIC 25 | Facility: CLINIC | Age: 43
End: 2023-03-02

## 2023-03-02 DIAGNOSIS — Z98.84 PERSONAL HISTORY OF GASTRIC BYPASS: ICD-10-CM

## 2023-03-02 DIAGNOSIS — R10.13 ABDOMINAL DISCOMFORT, EPIGASTRIC: ICD-10-CM

## 2023-03-02 PROCEDURE — 43235 EGD DIAGNOSTIC BRUSH WASH: CPT | Performed by: INTERNAL MEDICINE

## 2023-03-02 PROCEDURE — G8907 PT DOC NO EVENTS ON DISCHARG: HCPCS | Performed by: INTERNAL MEDICINE

## 2023-03-02 RX ORDER — PANTOPRAZOLE SODIUM 20 MG/1
1 TABLET TABLET, DELAYED RELEASE ORAL ONCE A DAY
Qty: 90 | Refills: 4 | Status: ACTIVE | COMMUNITY

## 2023-03-02 RX ORDER — HYDROCORTISONE ACETATE 25 MG/1
1 SUPPOSITORY SUPPOSITORY RECTAL ONCE A DAY
Qty: 28 | Refills: 0 | Status: ACTIVE | COMMUNITY
Start: 2023-02-21 | End: 2023-03-07

## 2023-03-02 RX ORDER — DEXLANSOPRAZOLE 60 MG/1
1 CAPSULE CAPSULE, DELAYED RELEASE ORAL ONCE A DAY
Qty: 90 | Refills: 0 | Status: ON HOLD | COMMUNITY
Start: 2022-08-02

## 2023-03-13 ENCOUNTER — LAB OUTSIDE AN ENCOUNTER (OUTPATIENT)
Dept: URBAN - METROPOLITAN AREA CLINIC 25 | Facility: CLINIC | Age: 43
End: 2023-03-13

## 2023-03-13 ENCOUNTER — OFFICE VISIT (OUTPATIENT)
Dept: URBAN - METROPOLITAN AREA CLINIC 25 | Facility: CLINIC | Age: 43
End: 2023-03-13
Payer: COMMERCIAL

## 2023-03-13 VITALS
HEIGHT: 66 IN | TEMPERATURE: 97.1 F | DIASTOLIC BLOOD PRESSURE: 89 MMHG | SYSTOLIC BLOOD PRESSURE: 135 MMHG | HEART RATE: 69 BPM | BODY MASS INDEX: 34.23 KG/M2 | WEIGHT: 213 LBS

## 2023-03-13 DIAGNOSIS — R10.9 ABDOMINAL CRAMPING: ICD-10-CM

## 2023-03-13 DIAGNOSIS — Z98.84 GASTRIC BYPASS STATUS FOR OBESITY: ICD-10-CM

## 2023-03-13 DIAGNOSIS — K92.1 HEMATOCHEZIA: ICD-10-CM

## 2023-03-13 DIAGNOSIS — Z98.890 HISTORY OF EXPLORATORY LAPAROTOMY: ICD-10-CM

## 2023-03-13 PROCEDURE — 99213 OFFICE O/P EST LOW 20 MIN: CPT | Performed by: INTERNAL MEDICINE

## 2023-03-13 RX ORDER — PANTOPRAZOLE SODIUM 20 MG/1
1 TABLET TABLET, DELAYED RELEASE ORAL ONCE A DAY
Qty: 90 | Refills: 4 | Status: ACTIVE | COMMUNITY

## 2023-03-13 RX ORDER — HYOSCYAMINE SULFATE 0.12 MG/1
1 TABLET UNDER THE TONGUE AND ALLOW TO DISSOLVE  AS NEEDED TABLET SUBLINGUAL THREE TIMES A DAY
Qty: 90 | Refills: 3 | OUTPATIENT
Start: 2023-03-13 | End: 2023-07-11

## 2023-03-13 NOTE — HPI-OTHER HISTORIES
August 22 visit: Patient underwent a repeat EGD and a colonoscopy for further evaluation of her GI symptoms.  EGD was performed in July 22 which revealed post gastric bypass anatomy, normal esophagus, gastric pouch and jejunum with a normal anastomosis.  Gastric pouch biopsy did not reveal any H. pylori and distal esophagus biopsy revealed mild reflux type changes.  A colonoscopy in July 22 revealed 2 hyperplastic sigmoid polyps, normal terminal ileum, no endoscopic colitis, path confirmed benign hyperplastic polyps.  on protonix 20 mg and using carafate. still gets heartburn. despite using otc laxatives gets constipated.  recently had gyn surgery - unclear on exact details .dr fer shrestha.  June 2022 visit: Upper GI was ordered but I do not see this was done. undergoing pelvic surgery. hx rectal bleeding X 1 month. intermittent symptoms. painless.1 garndparent had colon cancer. no prior colonoscopy. saw dr carlito lester and dr mcmillan. thinks she had a gastric bypass.   AUG 2021 :  EGD in August 2020 revealed post gastric sleeve anatomy but unrevealing endoscopic features.  No H. pylori infection, no reflux esophagitis present in proximal or distal esophageal biopsy.  underwent gastric surgery in sept 2020.  then underwent another revision surgery. was referred to  dr mcfadden. thinks she had another egd with possible dilation. Continues to have heartburn.    Went to [facility] and underwent CT scan - we dont have results. Was told she had an umbilical hernia ?  Records retrieved from [facility] from July 18, 2021.  Labs revealed a hemoglobin of 10.5, normal kidney function, normal WBC, normal AST, ALT, bilirubin, alkaline phosphatase, lipase,  a pelvic ultrasound revealed bilateral small simple ovarian cysts.  The CT scan of the abdomen and pelvis with contrast revealed small but irregular shaped fat-containing periumbilical hernia with mild stranding as well as a 4 mm right kidney stone and a left 3.3 cm simple appearing ovarian cyst.  AUG 2020 : stopped using ranitidine. no dysphagia. chest burning. seeing dr calixto who is planning surgery for abdominal hernia. EGD in July 2019 revealed post gastric sleeve anatomy with no other abnormal findings.  Biopsy from the stomach revealed chronic gastritis with no H. pylori, esophageal biopsy did not reveal any esophagitis. June 2019 - EGD in jan 2017 revealed gastric sleeve anatomy. gastritis. small hiatal hernia. No H Pylori.  Severe heartburn. taking zantac 150 mg which helps some. Went to pcp 2 weeks ago and had labs done. no dysphagia. Hx ventral hernia surgery.  2016  -Pt reports undergoing gastric sleeve surgery as well as lap cholecystectomy in 2013. She has been experiecing abdominal pain - supraumbilical, feels like a lump , tender to palpation, sensation of moving  / churning associated with lump.

## 2023-03-13 NOTE — HPI-TODAY'S VISIT:
March 2023 visit: Patient was operated in November 22 at [facility] following acute onset abdominal pain and was found to have a internal hernia needing laparotomy, laparoscopy, open redo of Antonietta-en-Y gastric bypass with a small bowel resection as well as a G-tube placement in the remnant stomach, GRAY drain as well as lysis of adhesions.  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  Procedure was performed by Dr. Camille Beaver.   She was evaluated in [place] [facility] in February 2023 for abdominal symptoms and mentions being asked to have a EGD with her gastroenterologist. We repeated EGD in February 2023 which revealed post gastric bypass anatomy with normal anastomosis, and examined extent of jejunum appeared normal.  intermittent rectal bleeding X 3 months. she is here with her mother. no diarrhea. mild abdominal cramping which is intermittent. she describes variety of symptoms but not able to explain her symptoms well.

## 2023-03-28 ENCOUNTER — TELEPHONE ENCOUNTER (OUTPATIENT)
Dept: URBAN - METROPOLITAN AREA CLINIC 25 | Facility: CLINIC | Age: 43
End: 2023-03-28

## 2023-03-30 ENCOUNTER — OFFICE VISIT (OUTPATIENT)
Dept: URBAN - METROPOLITAN AREA SURGERY CENTER 20 | Facility: SURGERY CENTER | Age: 43
End: 2023-03-30
Payer: COMMERCIAL

## 2023-03-30 ENCOUNTER — OFFICE VISIT (OUTPATIENT)
Dept: URBAN - METROPOLITAN AREA SURGERY CENTER 20 | Facility: SURGERY CENTER | Age: 43
End: 2023-03-30

## 2023-03-30 DIAGNOSIS — K92.1 ACUTE MELENA: ICD-10-CM

## 2023-03-30 PROCEDURE — G8907 PT DOC NO EVENTS ON DISCHARG: HCPCS | Performed by: INTERNAL MEDICINE

## 2023-03-30 PROCEDURE — 45378 DIAGNOSTIC COLONOSCOPY: CPT | Performed by: INTERNAL MEDICINE

## 2023-12-07 ENCOUNTER — OFFICE VISIT (OUTPATIENT)
Dept: URBAN - METROPOLITAN AREA CLINIC 84 | Facility: CLINIC | Age: 43
End: 2023-12-07

## 2023-12-11 ENCOUNTER — OFFICE VISIT (OUTPATIENT)
Dept: URBAN - METROPOLITAN AREA CLINIC 25 | Facility: CLINIC | Age: 43
End: 2023-12-11

## 2023-12-11 ENCOUNTER — TELEPHONE ENCOUNTER (OUTPATIENT)
Dept: URBAN - METROPOLITAN AREA CLINIC 25 | Facility: CLINIC | Age: 43
End: 2023-12-11

## 2023-12-11 RX ORDER — PANTOPRAZOLE SODIUM 20 MG/1
1 TABLET TABLET, DELAYED RELEASE ORAL ONCE A DAY
Qty: 90 | Refills: 4 | Status: ACTIVE | COMMUNITY

## 2023-12-11 NOTE — HPI-OTHER HISTORIES
March 2023 visit: Patient was operated in November 22 at [facility] following acute onset abdominal pain and was found to have a internal hernia needing laparotomy, laparoscopy, open redo of Antonietta-en-Y gastric bypass with a small bowel resection as well as a G-tube placement in the remnant stomach, GRAY drain as well as lysis of adhesions. 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 Procedure was performed by Dr. Camille Beaver.  She was evaluated in [place] ER in February 2023 for abdominal symptoms and mentions being asked to have a EGD with her gastroenterologist. We repeated EGD in February 2023 which revealed post gastric bypass anatomy with normal anastomosis, and examined extent of jejunum appeared normal.  intermittent rectal bleeding X 3 months. she is here with her mother. no diarrhea. mild abdominal cramping which is intermittent. she describes variety of symptoms but not able to explain her symptoms well.  August 22 visit: Patient underwent a repeat EGD and a colonoscopy for further evaluation of her GI symptoms.  EGD was performed in July 22 which revealed post gastric bypass anatomy, normal esophagus, gastric pouch and jejunum with a normal anastomosis.  Gastric pouch biopsy did not reveal any H. pylori and distal esophagus biopsy revealed mild reflux type changes.  A colonoscopy in July 22 revealed 2 hyperplastic sigmoid polyps, normal terminal ileum, no endoscopic colitis, path confirmed benign hyperplastic polyps.  on protonix 20 mg and using carafate. still gets heartburn. despite using otc laxatives gets constipated.  recently had gyn surgery - unclear on exact details .dr fer shrestha.  June 2022 visit: Upper GI was ordered but I do not see this was done. undergoing pelvic surgery. hx rectal bleeding X 1 month. intermittent symptoms. painless.1 garndparent had colon cancer. no prior colonoscopy. saw dr carlito lester and dr mcmillan. thinks she had a gastric bypass.   AUG 2021 :  EGD in August 2020 revealed post gastric sleeve anatomy but unrevealing endoscopic features.  No H. pylori infection, no reflux esophagitis present in proximal or distal esophageal biopsy.  underwent gastric surgery in sept 2020.  then underwent another revision surgery. was referred to  dr mcfadden. thinks she had another egd with possible dilation. Continues to have heartburn.    Went to [facility] and underwent CT scan - we dont have results. Was told she had an umbilical hernia ?  [facility] July [facility], 2021.  Labs revealed a hemoglobin of 10.5, normal kidney function, normal WBC, normal AST, ALT, bilirubin, alkaline phosphatase, lipase,  a pelvic ultrasound revealed bilateral small simple ovarian cysts.  The CT scan of the abdomen and pelvis with contrast revealed small but irregular shaped fat-containing periumbilical hernia with mild stranding as well as a 4 mm right kidney stone and a left 3.3 cm simple appearing ovarian cyst.  AUG 2020 : stopped using ranitidine. no dysphagia. chest burning. seeing dr calixto who is planning surgery for abdominal hernia. EGD in July 2019 revealed post gastric sleeve anatomy with no other abnormal findings.  Biopsy from the stomach revealed chronic gastritis with no H. pylori, esophageal biopsy did not reveal any esophagitis. June 2019 - EGD in jan 2017 revealed gastric sleeve anatomy. gastritis. small hiatal hernia. No H Pylori.  Severe heartburn. taking zantac 150 mg which helps some. Went to pcp 2 weeks ago and had labs done. no dysphagia. Hx ventral hernia surgery.  2016  -Pt reports undergoing gastric sleeve surgery as well as lap cholecystectomy in 2013. She has been experiecing abdominal pain - supraumbilical, feels like a lump , tender to palpation, sensation of moving  / churning associated with lump.

## 2023-12-11 NOTE — HPI-TODAY'S VISIT:
December 2023 visit:We performed a colonoscopy in March 2023 which revealed mildly suboptimal prep and scattered segments of the colon but overall normal exam up to the TI.EGD in March 2023 revealed post gastric bypass anatomy with completely normal exam

## 2023-12-27 ENCOUNTER — DASHBOARD ENCOUNTERS (OUTPATIENT)
Age: 43
End: 2023-12-27

## 2023-12-28 ENCOUNTER — OFFICE VISIT (OUTPATIENT)
Dept: URBAN - METROPOLITAN AREA CLINIC 84 | Facility: CLINIC | Age: 43
End: 2023-12-28

## 2023-12-28 RX ORDER — PANTOPRAZOLE SODIUM 20 MG/1
1 TABLET TABLET, DELAYED RELEASE ORAL ONCE A DAY
Qty: 90 | Refills: 4 | Status: ACTIVE | COMMUNITY

## 2023-12-28 NOTE — HPI-OTHER HISTORIES
March 2023 visit: Patient was operated in November 22 at [facility] following acute onset abdominal pain and was found to have a internal hernia needing laparotomy, laparoscopy, open redo of Antonietta-en-Y gastric bypass with a small bowel resection as well as a G-tube placement in the remnant stomach, GRAY drain as well as lysis of adhesions. 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 Procedure was performed by Dr. Camille Beaver.  She was evaluated in [place] ER in February 2023 for abdominal symptoms and mentions being asked to have a EGD with her gastroenterologist. We repeated EGD in February 2023 which revealed post gastric bypass anatomy with normal anastomosis, and examined extent of jejunum appeared normal.  intermittent rectal bleeding X 3 months. she is here with her mother. no diarrhea. mild abdominal cramping which is intermittent. she describes variety of symptoms but not able to explain her symptoms well.  August 22 visit: Patient underwent a repeat EGD and a colonoscopy for further evaluation of her GI symptoms.  EGD was performed in July 22 which revealed post gastric bypass anatomy, normal esophagus, gastric pouch and jejunum with a normal anastomosis.  Gastric pouch biopsy did not reveal any H. pylori and distal esophagus biopsy revealed mild reflux type changes.  A colonoscopy in July 22 revealed 2 hyperplastic sigmoid polyps, normal terminal ileum, no endoscopic colitis, path confirmed benign hyperplastic polyps.  on protonix 20 mg and using carafate. still gets heartburn. despite using otc laxatives gets constipated.  recently had gyn surgery - unclear on exact details .dr fer shrestha.  June 2022 visit: Upper GI was ordered but I do not see this was done. undergoing pelvic surgery. hx rectal bleeding X 1 month. intermittent symptoms. painless.1 garndparent had colon cancer. no prior colonoscopy. saw dr carlito lester and dr mcmillan. thinks she had a gastric bypass.   AUG 2021 :  EGD in August 2020 revealed post gastric sleeve anatomy but unrevealing endoscopic features.  No H. pylori infection, no reflux esophagitis present in proximal or distal esophageal biopsy.  underwent gastric surgery in sept 2020.  then underwent another revision surgery. was referred to  dr mcfadden. thinks she had another egd with possible dilation. Continues to have heartburn.    Went to [facility] and underwent CT scan - we dont have results. Was told she had an umbilical hernia ?  Records retrieved from [facility] from July 18, 2021.  Labs revealed a hemoglobin of 10.5, normal kidney function, normal WBC, normal AST, ALT, bilirubin, alkaline phosphatase, lipase,  a pelvic ultrasound revealed bilateral small simple ovarian cysts.  The CT scan of the abdomen and pelvis with contrast revealed small but irregular shaped fat-containing periumbilical hernia with mild stranding as well as a 4 mm right kidney stone and a left 3.3 cm simple appearing ovarian cyst.  AUG 2020 : stopped using ranitidine. no dysphagia. chest burning. seeing dr calixto who is planning surgery for abdominal hernia. EGD in July 2019 revealed post gastric sleeve anatomy with no other abnormal findings.  Biopsy from the stomach revealed chronic gastritis with no H. pylori, esophageal biopsy did not reveal any esophagitis. June 2019 - EGD in jan 2017 revealed gastric sleeve anatomy. gastritis. small hiatal hernia. No H Pylori.  Severe heartburn. taking zantac 150 mg which helps some. Went to pcp 2 weeks ago and had labs done. no dysphagia. Hx ventral hernia surgery.  2016  -Pt reports undergoing gastric sleeve surgery as well as lap cholecystectomy in 2013. She has been experiecing abdominal pain - supraumbilical, feels like a lump , tender to palpation, sensation of moving  / churning associated with lump.